# Patient Record
Sex: MALE | Race: WHITE
[De-identification: names, ages, dates, MRNs, and addresses within clinical notes are randomized per-mention and may not be internally consistent; named-entity substitution may affect disease eponyms.]

---

## 2019-06-29 ENCOUNTER — HOSPITAL ENCOUNTER (EMERGENCY)
Dept: HOSPITAL 47 - EC | Age: 49
Discharge: HOME | End: 2019-06-29
Payer: COMMERCIAL

## 2019-06-29 VITALS — SYSTOLIC BLOOD PRESSURE: 147 MMHG | RESPIRATION RATE: 16 BRPM | HEART RATE: 70 BPM | DIASTOLIC BLOOD PRESSURE: 75 MMHG

## 2019-06-29 VITALS — TEMPERATURE: 98.9 F

## 2019-06-29 DIAGNOSIS — J02.9: Primary | ICD-10-CM

## 2019-06-29 DIAGNOSIS — F17.200: ICD-10-CM

## 2019-06-29 PROCEDURE — 99284 EMERGENCY DEPT VISIT MOD MDM: CPT

## 2019-06-29 PROCEDURE — 96372 THER/PROPH/DIAG INJ SC/IM: CPT

## 2019-06-29 NOTE — ED
General Adult HPI





- General


Chief complaint: Upper Respiratory Infection


Stated complaint: KRISTOPHER


Time Seen by Provider: 06/29/19 11:16


Source: patient


Mode of arrival: ambulatory


Limitations: no limitations





- History of Present Illness


Initial comments: 





Patient is a 49 presents emergency department with a sore throat.  Patient 

reports the symptoms started approximately 2-3 days ago and has not resolved 

since.  Patient also reports clear bilateral rhinorrhea but denies sinus or 

maxillary congestion.  Patient reports a nonproductive cough but states that he 

only does it to clear his throat.  Patient also reports changes in his voice 

causing it to be "squeaky."  Patient denies drooling or dysphagia.  Patient 

denies asthma but is a smoker.  Patient denies fever, headache, nausea, 

vomiting, diarrhea.  Patient denies taking any medication to relieve the pain.





- Related Data


                                Home Medications











 Medication  Instructions  Recorded  Confirmed


 


No Known Home Medications  06/29/19 06/29/19











                                    Allergies











Allergy/AdvReac Type Severity Reaction Status Date / Time


 


No Known Allergies Allergy   Verified 06/29/19 12:17














Review of Systems


ROS Statement: 


Those systems with pertinent positive or pertinent negative responses have been 

documented in the HPI.





ROS Other: All systems not noted in ROS Statement are negative.





Past Medical History


Past Medical History: No Reported History


History of Any Multi-Drug Resistant Organisms: None Reported


Past Surgical History: No Surgical Hx Reported


Past Psychological History: No Psychological Hx Reported


Smoking Status: Current every day smoker


Past Alcohol Use History: Occasional


Past Drug Use History: None Reported





General Exam


Limitations: no limitations


General appearance: alert, in no apparent distress


Head exam: Present: atraumatic, normocephalic, normal inspection


Eye exam: Present: normal appearance, PERRL, EOMI


Pupils: Present: normal accommodation


ENT exam: Present: normal oropharynx (Mild tonsillar erythema.  No tonsillar 

enlargement or exudates.  No periapical abscesses, edema or erythema.), mucous 

membranes moist, TM's normal bilaterally


Neck exam: Present: normal inspection, full ROM.  Absent: tenderness, 

lymphadenopathy


Respiratory exam: Present: normal lung sounds bilaterally


Cardiovascular Exam: Present: regular rate, normal rhythm, normal heart sounds


Extremities exam: Present: normal inspection, full ROM


Back exam: Present: normal inspection, full ROM


Neurological exam: Present: alert, oriented X3


Psychiatric exam: Present: normal affect, normal mood


Skin exam: Present: warm, intact, normal color





Course


                                   Vital Signs











  06/29/19





  11:10


 


Temperature 98.9 F


 


Pulse Rate 77


 


Respiratory 18





Rate 


 


Blood Pressure 142/78


 


O2 Sat by Pulse 98





Oximetry 














Medical Decision Making





- Medical Decision Making





Patient is a 49-year-old male presents emergency Department with a sore throat. 

Rapid strep is negative.  At this point I have low suspicion for strep rigors.  

Patient history and physical examination suspect the patient to have viral 

laryngitis due to changes in his voice, mild cough, rhinorrhea no fever.  

Patient also does have symmetrical tonsils with no uveal deviation so I have low

suspicion for a peritonsillar abscess.  Patient advised to follow with primary 

care.  Patient advised to return to emergency department if he develops fever, 

drooling or dysphagia.  Patient is agreeable and understandable.  Case discussed

with physician.





Disposition


Clinical Impression: 


 Sore throat





Disposition: HOME SELF-CARE


Condition: Stable


Instructions (If sedation given, give patient instructions):  Upper Respiratory 

Infection (ED)


Additional Instructions: 


Please return to emergency department if he develops signs of fever, drooling or

headaches.  Please follow with primary care.  Position to emergency department 

if symptoms worsen.


Is patient prescribed a controlled substance at d/c from ED?: No


Referrals: 


Denzel Jensen MD [Primary Care Provider] - 1-2 days


Time of Disposition: 12:12

## 2023-06-27 ENCOUNTER — HOSPITAL ENCOUNTER (EMERGENCY)
Dept: HOSPITAL 47 - EC | Age: 53
Discharge: HOME | End: 2023-06-27
Payer: COMMERCIAL

## 2023-06-27 VITALS
DIASTOLIC BLOOD PRESSURE: 86 MMHG | RESPIRATION RATE: 18 BRPM | HEART RATE: 102 BPM | TEMPERATURE: 98.3 F | SYSTOLIC BLOOD PRESSURE: 123 MMHG

## 2023-06-27 DIAGNOSIS — L25.9: Primary | ICD-10-CM

## 2023-06-27 DIAGNOSIS — F17.200: ICD-10-CM

## 2023-06-27 PROCEDURE — 99282 EMERGENCY DEPT VISIT SF MDM: CPT

## 2023-06-27 NOTE — ED
Skin/Abscess/FB HPI





- General


Chief complaint: Skin/Abscess/Foreign Body


Stated complaint: LEFT ARM ISINJURY


Time Seen by Provider: 06/27/23 06:47


Source: patient, RN notes reviewed


Mode of arrival: ambulatory


Limitations: no limitations





- History of Present Illness


Initial comments: 


53-year-old male presents emergency Department chief complaint of her rashes 

left arm.  Patient states he woke up this rash.  He states that she or painful. 

Patient states she's unsure what started this.  Patient states he has no 

paresthesias no other complaints denies being in the woods.  He states that his 

work secondary believes that he was bit by a spider.  Patient offers no other 

associated symptoms








- Related Data


                                  Previous Rx's











 Medication  Instructions  Recorded


 


Amoxicillin 500 mg PO Q8H #30 capsule 06/29/19











                                    Allergies











Allergy/AdvReac Type Severity Reaction Status Date / Time


 


No Known Allergies Allergy   Verified 06/27/23 06:57














Review of Systems


ROS Statement: 


Those systems with pertinent positive or pertinent negative responses have been 

documented in the HPI.





ROS Other: All systems not noted in ROS Statement are negative.





Past Medical History


Past Medical History: No Reported History


History of Any Multi-Drug Resistant Organisms: None Reported


Past Surgical History: No Surgical Hx Reported


Past Psychological History: No Psychological Hx Reported


Smoking Status: Current every day smoker


Past Alcohol Use History: Occasional


Past Drug Use History: None Reported





General Exam


Limitations: no limitations


General appearance: alert, in no apparent distress


Head exam: Present: atraumatic, normocephalic, normal inspection


Eye exam: Present: normal appearance, PERRL, EOMI.  Absent: scleral icterus, 

conjunctival injection, periorbital swelling


Neck exam: Present: normal inspection, full ROM.  Absent: tenderness, 

meningismus, lymphadenopathy


Respiratory exam: Present: normal lung sounds bilaterally.  Absent: respiratory 

distress, wheezes, rales, rhonchi, stridor


Cardiovascular Exam: Present: regular rate, normal rhythm, normal heart sounds. 

Absent: systolic murmur, diastolic murmur, rubs, gallop, clicks


Extremities exam: Present: other (Forearm there is erythematous based rash with 

no vesicular areas, blanchable, no open lesions or sores)


Neurological exam: Present: alert





Course





                                   Vital Signs











  06/27/23





  06:54


 


Temperature 98.3 F


 


Pulse Rate 102 H


 


Respiratory 18





Rate 


 


Blood Pressure 123/86


 


O2 Sat by Pulse 98





Oximetry 














Medical Decision Making





- Medical Decision Making


Was pt. sent in by a medical professional or institution (EREN Flor, NP, urgent 

care, hospital, or nursing home...) When possible be specific


@  -No


Did you speak to anyone other than the patient for history (EMS, parent, family,

police, friend...)? What history was obtained from this source 


@  -No


Did you review nursing and triage notes (agree or disagree)?  Why? 


@  -I reviewed and agree with nursing and triage notes


Were old charts reviewed (outside hosp., previous admission, EMS record, old 

EKG, old radiological studies, urgent care reports/EKG's, nursing home records)?

Report findings 


@  -No old charts were reviewed


Differential Diagnosis (chest pain, altered mental status, abdominal pain women,

abdominal pain men, vaginal bleeding, weakness, fever, dyspnea, syncope, 

headache, dizziness, GI bleed, back pain, seizure, CVA, palpatations, mental 

health, musculoskeletal)? 


@  -Contact dermatitis, shingles, cellulitis, insect bite


EKG interpreted by me (3pts min.).


@  -None


X-rays interpreted by me (1pt min.).


@  -None done


CT interpreted by me (1pt min.).


@  -None done


U/S interpreted by me (1pt. min.).


@  -None done


What testing was considered but not performed or refused? (CT, X-rays, U/S, 

labs)? Why?


@  -None


What meds were considered but not given or refused? Why?


@  -None


Did you discuss the management of the patient with other professionals 

(professionals i.e. EREN Flor, NP, lab, RT, psych nurse, , , 

teacher, , )? Give summary


@  -No


Was smoking cessation discussed for >3mins.?


@  -No


Was critical care preformed (if so, how long)?


@  -No


Were there social determinants of health that impacted care today? How? 

(Homelessness, low income, unemployed, alcoholism, drug addiction, 

transportation, low edu. Level, literacy, decrease access to med. care, assisted, 

rehab)?


@  -No


Was there de-escalation of care discussed even if they declined (Discuss DNR or 

withdrawal of care, Hospice)? DNR status


@  -No


What co-morbidities impacted this encounter? (DM, HTN, Smoking, COPD, CAD, 

Cancer, CVA, ARF, Chemo, Hep., AIDS, mental health diagnosis, sleep apnea, 

morbid obesity)?


@  -None


Was patient admitted / discharged? Hospital course, mention meds given and 

route, prescriptions, significant lab abnormalities, going to OR and other 

pertinent info.


@  -Discharge patient developed acute rash with no other associated symptoms.  

We discussed possibility of early shingles, possible infectious reasons though 

given a daily his symptoms with chief complaint dermatitis. 


Undiagnosed new problem with uncertain prognosis?


@  -No


Drug Therapy requiring intensive monitoring for toxicity (Heparin, Nitro, 

Insulin, Cardizem)?


@  -No


Were any procedures done?


@  -No


Diagnosis/symptom?


@  -Contact dermatitis, rash


Acute, or Chronic, or Acute on Chronic?


@  -Acute


Uncomplicated (without systemic symptoms) or Complicated (systemic symptoms)?


@  -Uncomplicated


Side effects of treatment?


@  -No


Exacerbation, Progression, or Severe Exacerbation?


@  -No


Poses a threat to life or bodily function? How? (Chest pain, USA, MI, pneumonia,

PE, COPD, DKA, ARF, appy, cholecystitis, CVA, Diverticulitis, Homicidal, 

Suicidal, threat to staff... and all critical care pts)


@  -No








Disposition


Clinical Impression: 


 Contact dermatitis





Disposition: HOME SELF-CARE


Condition: Stable


Instructions (If sedation given, give patient instructions):  Contact Dermatitis

(ED)


Additional Instructions: 


Apply cream twice daily. Please return to the Emergency Department if symptoms 

worsen or any other concerns.


Is patient prescribed a controlled substance at d/c from ED?: No


Referrals: 


Denzel Jensen MD [Primary Care Provider] - 1-2 days


Time of Disposition: 07:38